# Patient Record
Sex: FEMALE | Race: WHITE | URBAN - METROPOLITAN AREA
[De-identification: names, ages, dates, MRNs, and addresses within clinical notes are randomized per-mention and may not be internally consistent; named-entity substitution may affect disease eponyms.]

---

## 2017-12-13 ENCOUNTER — TELEPHONE (OUTPATIENT)
Dept: NEUROLOGY | Facility: CLINIC | Age: 42
End: 2017-12-13

## 2017-12-13 RX ORDER — CLONAZEPAM 1 MG/1
1.5 TABLET ORAL AT BEDTIME
COMMUNITY
Start: 2017-11-08

## 2017-12-13 RX ORDER — HYDROXYZINE PAMOATE 25 MG/1
25-50 CAPSULE ORAL EVERY 6 HOURS PRN
COMMUNITY
Start: 2017-10-31

## 2017-12-13 RX ORDER — ATENOLOL 25 MG/1
25 TABLET ORAL DAILY
COMMUNITY
Start: 2017-10-31 | End: 2018-05-02

## 2017-12-13 RX ORDER — EPINEPHRINE 0.3 MG/.3ML
0.3 INJECTION SUBCUTANEOUS PRN
COMMUNITY
Start: 2017-10-31

## 2017-12-13 RX ORDER — ALPRAZOLAM 0.5 MG
0.5 TABLET ORAL 2 TIMES DAILY PRN
COMMUNITY
Start: 2017-10-12

## 2017-12-13 RX ORDER — DIPHENHYDRAMINE HCL 25 MG
25 CAPSULE ORAL EVERY 6 HOURS
COMMUNITY

## 2017-12-13 RX ORDER — MULTIVITAMIN WITH FOLIC ACID 400 MCG
1 TABLET ORAL DAILY
COMMUNITY
Start: 2017-12-08

## 2017-12-13 RX ORDER — OXYCODONE AND ACETAMINOPHEN 5; 325 MG/1; MG/1
1 TABLET ORAL EVERY 8 HOURS PRN
COMMUNITY
Start: 2018-01-05 | End: 2018-05-02
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
## 2017-12-14 ENCOUNTER — OFFICE VISIT (OUTPATIENT)
Dept: NEUROLOGY | Facility: CLINIC | Age: 42
End: 2017-12-14
Payer: COMMERCIAL

## 2017-12-14 VITALS
BODY MASS INDEX: 29.8 KG/M2 | DIASTOLIC BLOOD PRESSURE: 64 MMHG | HEART RATE: 73 BPM | TEMPERATURE: 98.4 F | SYSTOLIC BLOOD PRESSURE: 107 MMHG | HEIGHT: 66 IN | WEIGHT: 185.4 LBS

## 2017-12-14 DIAGNOSIS — G44.40 MEDICATION OVERUSE HEADACHE: ICD-10-CM

## 2017-12-14 DIAGNOSIS — Z87.828 HISTORY OF HEAD INJURY: ICD-10-CM

## 2017-12-14 DIAGNOSIS — G43.109 MIGRAINE WITH AURA AND WITHOUT STATUS MIGRAINOSUS, NOT INTRACTABLE: ICD-10-CM

## 2017-12-14 DIAGNOSIS — R56.9 CONVULSIONS, UNSPECIFIED CONVULSION TYPE (H): Primary | ICD-10-CM

## 2017-12-14 PROCEDURE — 99204 OFFICE O/P NEW MOD 45 MIN: CPT | Performed by: PSYCHIATRY & NEUROLOGY

## 2017-12-14 RX ORDER — GABAPENTIN 300 MG/1
CAPSULE ORAL
Qty: 90 CAPSULE | Refills: 3 | Status: SHIPPED | OUTPATIENT
Start: 2017-12-14 | End: 2018-05-02

## 2017-12-14 ASSESSMENT — PAIN SCALES - GENERAL: PAINLEVEL: EXTREME PAIN (9)

## 2017-12-14 NOTE — NURSING NOTE
MIRTA faxed to [facility] [phone].  Transmission confirmed via Right Fax.  MIRTA faxed to [facility].  [phone].  Transmission confirmed via Right Fax./ 01/04/18  Received records from [facility].  03/29/18  MIRTA re-faxed to [facility].  Janette Christianson NR-CMA/ 04/05/18 Records received from [facility].
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
## 2017-12-14 NOTE — PATIENT INSTRUCTIONS
"Plan:    MRI Brain.  Sleep-deprived electroencephalogram.   Talk to your primary care provider about tapering off of the Percocet. Goal is <2 times per week of any \"as needed\" headache medication.   Continue the atenolol.  Add gabapentin 300mg at bedtime to start --> Increase as instructed. *Information provided.   Return to clinic in 6-8 weeks.   "

## 2017-12-14 NOTE — NURSING NOTE
"Chief Complaint   Patient presents with     New Patient     Seizures, TBI, migraines.       Initial /64 (BP Location: Right arm, Patient Position: Chair, Cuff Size: Adult Regular)  Pulse 73  Temp 98.4  F (36.9  C) (Oral)  Ht 1.676 m (5' 6\")  Wt 84.1 kg (185 lb 6.4 oz)  Breastfeeding? No  BMI 29.92 kg/m2 Estimated body mass index is 29.92 kg/(m^2) as calculated from the following:    Height as of this encounter: 1.676 m (5' 6\").    Weight as of this encounter: 84.1 kg (185 lb 6.4 oz).  Medication Reconciliation: complete    Patient prefers to be contacted: letter, [phone] (mike) [address] [place]  [address]  Okay to leave detailed message on voicemail: n/a    Janette Christianson NR-CMA    "
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
## 2017-12-14 NOTE — MR AVS SNAPSHOT
"              After Visit Summary   12/14/2017    Selena Parson    MRN: [id number]           Patient Information     Date Of Birth          1975        Visit Information        Provider Department      12/14/2017 9:30 AM Danay Daniel MD [facility]        Today's Diagnoses     Convulsions, unspecified convulsion type (H)    -  1    History of head injury        Migraine with aura and without status migrainosus, not intractable        Medication overuse headache          Care Instructions    Plan:    MRI Brain.  Sleep-deprived electroencephalogram.   Talk to your primary care provider about tapering off of the Percocet. Goal is <2 times per week of any \"as needed\" headache medication.   Continue the atenolol.  Add gabapentin 300mg at bedtime to start --> Increase as instructed. *Information provided.   Return to clinic in 6-8 weeks.           Follow-ups after your visit        Follow-up notes from your care team     Return in about 6 weeks (around 1/25/2018).      Future tests that were ordered for you today     Open Future Orders        Priority Expected Expires Ordered    EEG sleep deprived Routine  4/14/2018 12/14/2017    MR Brain w/o & w Contrast Routine  12/14/2018 12/14/2017            Who to contact     If you have questions or need follow up information about today's clinic visit or your schedule please contact [facility] directly at [phone].  Normal or non-critical lab and imaging results will be communicated to you by MyChart, letter or phone within 4 business days after the clinic has received the results. If you do not hear from us within 7 days, please contact the clinic through MyChart or phone. If you have a critical or abnormal lab result, we will notify you by phone as soon as possible.  Submit refill requests through [employer] or call your pharmacy and they will forward the refill request to us. Please allow 3 business days for your refill to be completed. " "         Additional Information About Your Visit        MyChart Information     [employer] lets you send messages to your doctor, view your test results, renew your prescriptions, schedule appointments and more. To sign up, go to [email or web address] . Click on \"Log in\" on the left side of the screen, which will take you to the Welcome page. Then click on \"Sign up Now\" on the right side of the page.     You will be asked to enter the access code listed below, as well as some personal information. Please follow the directions to create your username and password.     Your access code is: [email or web address]  Expires: 3/14/2018 10:42 AM     Your access code will  in 90 days. If you need help or a new code, please call your [place] clinic or [phone].        Care EveryWhere ID     This is your Care EveryWhere ID. This could be used by other organizations to access your [facility] medical records  [id number]        Your Vitals Were     Pulse Temperature Height Breastfeeding? BMI (Body Mass Index)       73 98.4  F (36.9  C) (Oral) 1.676 m (5' 6\") No 29.92 kg/m2        Blood Pressure from Last 3 Encounters:   17 107/64    Weight from Last 3 Encounters:   17 84.1 kg (185 lb 6.4 oz)                 Today's Medication Changes          These changes are accurate as of: 17 10:42 AM.  If you have any questions, ask your nurse or doctor.               Start taking these medicines.        Dose/Directions    gabapentin 300 MG capsule   Commonly known as:  NEURONTIN   Used for:  Medication overuse headache, Convulsions, unspecified convulsion type (H), Migraine with aura and without status migrainosus, not intractable   Started by:  Danay Daniel MD        Take 1 tablet (300 mg) every night for 1-3 days, then 1 tablet twice daily for 1-3 days, then 1 tablet three times daily   Quantity:  90 capsule   Refills:  3            Where to get your medicines      These medications were sent to [id number] - " [place], [place] - [address]  [address] [address]     Phone:  [phone]     gabapentin 300 MG capsule                Primary Care Provider Office Phone # Fax #    Kathryn Moore [phone]       [facility] [address] [place] [address]        Equal Access to Services     [facility]: Hadii aad ku hadasho Soomaali, waaxda luqadaha, qaybta kaalmada adeegyada, waxay idiin hayaan adeeg kharash la'aan . So [facility] [phone].    ATENCIÓN: Si habla español, tiene a bai disposición servicios gratuitos de asistencia lingüística. Kaylenenedelia al [phone].    We comply with applicable federal civil rights laws and [place] laws. We do not discriminate on the basis of race, color, national origin, age, disability, sex, sexual orientation, or gender identity.            Thank you!     Thank you for choosing [facility]  for your care. Our goal is always to provide you with excellent care. Hearing back from our patients is one way we can continue to improve our services. Please take a few minutes to complete the written survey that you may receive in the mail after your visit with us. Thank you!             Your Updated Medication List - Protect others around you: Learn how to safely use, store and throw away your medicines at www.disposemymeds.org.          This list is accurate as of: 12/14/17 10:42 AM.  Always use your most recent med list.                   Brand Name Dispense Instructions for use Diagnosis    ALPRAZolam 0.5 MG tablet    XANAX     Take 0.5 mg by mouth 2 times daily as needed For panic        atenolol 25 MG tablet    TENORMIN     Take 25 mg by mouth daily For migraine prevention        clonazePAM 1 MG tablet    klonoPIN     Take 1.5 mg by mouth At Bedtime For sleep        diphenhydrAMINE 25 MG capsule    BENADRYL     Take 25 mg by mouth every 6 hours        EPINEPHrine 0.3 MG/0.3ML injection 2-pack    EPIPEN/ADRENACLICK/or ANY BX  GENERIC EQUIV     Inject 0.3 mg into the muscle as needed for anaphylaxis        gabapentin 300 MG capsule    NEURONTIN    90 capsule    Take 1 tablet (300 mg) every night for 1-3 days, then 1 tablet twice daily for 1-3 days, then 1 tablet three times daily    Medication overuse headache, Convulsions, unspecified convulsion type (H), Migraine with aura and without status migrainosus, not intractable       hydrOXYzine 25 MG capsule    VISTARIL     Take 25-50 mg by mouth every 6 hours as needed for nausea        oxyCODONE-acetaminophen 5-325 MG per tablet   Start taking on:  1/5/2018    PERCOCET     Take 1 tablet by mouth every 8 hours as needed for migraine        TAB-A-DOM Tabs      Take 1 tablet by mouth daily        tiZANidine 4 MG tablet    ZANAFLEX     Take 4 mg by mouth every 8 hours as needed Muscle spasms

## 2017-12-15 ENCOUNTER — TELEPHONE (OUTPATIENT)
Dept: NEUROLOGY | Facility: CLINIC | Age: 42
End: 2017-12-15

## 2017-12-15 NOTE — TELEPHONE ENCOUNTER
I spoke to Selena today. She saw Dr Daniel for the first time regarding head trauma/ headaches. She was given Gabapentin 300 mg to take at bedtime. She said that when she took the pill last night she began to experience upset stomach, blacking out, dizziness, light headedness. She stated multiple times that she has history of eating disorder. I asked if she ate and drank fluids normally yesterday and today. She said that she did. I asked if she has blacked out before and she said that she had. I told her that it is not unusual to have stomach aches and even dizziness to begin with, but this generally goes away. Blacking out would not be expected. I asked if she thought she had a seizure but she did not seem to think so, nor could she say how long she was out. I suggested that she not drive and to discontinue the medication until we hear from Dr Daniel. She said that she does not drive anyway. I told her , although not expected if here symptoms became worse including continued blacking out or any other life threatening symptom such as difficulty breathing or swallowing then she should call 911. I told her that Dr Daniel is not in clinic today that I would route her a message. She will hold the medication until we hear from Dr Daniel. Dr Daniel , how do you advise ?Karoline YODER RN

## 2017-12-15 NOTE — TELEPHONE ENCOUNTER
Reason for Call:  Other prescription    Detailed comments: pt calling statin she was seen yesterday. Took one gabapentin last night, experiencing upset stomach,  Blacking out, dizziness, light headedness. Had a eating disorder in the past and still has breakthroughs. Not sure if these are side effects from the gabapentin.     Phone Number Patient can be reached at: Home number on file [phone] (home)    Best Time: any     Can we leave a detailed message on this number? YES    Call taken on 12/15/2017 at 2:23 PM by Ayleen Ko

## 2017-12-18 NOTE — TELEPHONE ENCOUNTER
Spoke with pt and advised of note below. Discussed that if she stopped the medication and she felt better then she would know it was the medication making her dizzy. Pt stated she would decide if she wanted to stop the medication.   Advised pt that if she stopped the medication and continued to have symptoms to call and if she was feeling better that she should keep her scheduled appt for follow up. Pt agreed with plan.   Jigna HOBSON RN BSN PHN  Specialty Clinics

## 2017-12-18 NOTE — TELEPHONE ENCOUNTER
No, the blacking out would not be typical for the gabapentin and there is likely a different cause. If she continues to have problems with dizziness and upset stomach, she should stop the medication.     CY

## 2018-01-09 ENCOUNTER — TELEPHONE (OUTPATIENT)
Dept: NEUROLOGY | Facility: CLINIC | Age: 43
End: 2018-01-09

## 2018-01-09 DIAGNOSIS — F40.240 CLAUSTROPHOBIA: Primary | ICD-10-CM

## 2018-01-09 RX ORDER — LORAZEPAM 1 MG/1
TABLET ORAL
Qty: 2 TABLET | Refills: 0 | Status: SHIPPED | OUTPATIENT
Start: 2018-01-09 | End: 2018-05-02

## 2018-01-09 NOTE — TELEPHONE ENCOUNTER
Reason for Call:  Other call back    Detailed comments: EEG & MRI were recommended.  Pt went to schedule and they asked her if she is claustrophobic - which she is.  She was told to contact MD for medication to help with this.  Pharmacy: [facility]    Phone Number Patient can be reached at: Home number on file [phone] (home)    Best Time: any    Can we leave a detailed message on this number? No doesn't have it but you could try her also at [phone]    Call taken on 1/9/2018 at 12:51 PM by Edie Rachel

## 2018-01-09 NOTE — TELEPHONE ENCOUNTER
I checked with Selena--  They've scheduled the EEG at 8am that day, and the MRI at noon, because of the sedation issue.      Prescription cued up.

## 2018-01-09 NOTE — TELEPHONE ENCOUNTER
Pt requesting medication for claustrophobia for EEG and MRI.  See note below.  Jigna HOBSON RN BSN PHN  Specialty Clinics

## 2018-01-18 ENCOUNTER — HOSPITAL ENCOUNTER (OUTPATIENT)
Dept: MRI IMAGING | Facility: CLINIC | Age: 43
End: 2018-01-18
Attending: PSYCHIATRY & NEUROLOGY
Payer: COMMERCIAL

## 2018-01-18 ENCOUNTER — HOSPITAL ENCOUNTER (OUTPATIENT)
Dept: NEUROLOGY | Facility: CLINIC | Age: 43
Discharge: HOME | End: 2018-01-18
Attending: PSYCHIATRY & NEUROLOGY | Admitting: PSYCHIATRY & NEUROLOGY
Payer: COMMERCIAL

## 2018-01-18 DIAGNOSIS — R56.9 CONVULSIONS, UNSPECIFIED CONVULSION TYPE (H): ICD-10-CM

## 2018-01-18 DIAGNOSIS — Z87.828 HISTORY OF HEAD INJURY: ICD-10-CM

## 2018-01-18 DIAGNOSIS — G43.109 MIGRAINE WITH AURA AND WITHOUT STATUS MIGRAINOSUS, NOT INTRACTABLE: ICD-10-CM

## 2018-01-18 PROCEDURE — 25000128 H RX IP 250 OP 636: Performed by: PSYCHIATRY & NEUROLOGY

## 2018-01-18 PROCEDURE — 95819 EEG AWAKE AND ASLEEP: CPT

## 2018-01-18 PROCEDURE — 70553 MRI BRAIN STEM W/O & W/DYE: CPT

## 2018-01-18 PROCEDURE — A9585 GADOBUTROL INJECTION: HCPCS | Performed by: PSYCHIATRY & NEUROLOGY

## 2018-01-18 RX ORDER — GADOBUTROL 604.72 MG/ML
8 INJECTION INTRAVENOUS ONCE
Status: COMPLETED | OUTPATIENT
Start: 2018-01-18 | End: 2018-01-18

## 2018-01-18 RX ADMIN — GADOBUTROL 8 ML: 604.72 INJECTION INTRAVENOUS at 13:13

## 2018-01-20 NOTE — PROCEDURES
OUTPATIENT SLEEP-DEPRIVED EEG REPORT.    Patient Name:  Selena Parson  MRN:     [id number]  : `    1975    EEG#: .        DATE OF RECORDIN2018.       DURATION OF RECORDIN minutes and 54 seconds.        CLINICAL SUMMARY: Selena Parson is 42 year old female patient with past medical history of insomnia, anxiety, head trauma, and migraine, who was recently seen in neurology clinic for spells of altered consciousness. This study was ordered to evaluate for seizures and epileptiform abnormalities. On the day of the study, the patient was reported to take lorazepam, gabapentin, Xanax, Klonopin, Benadryl, Percocet, and Zanaflex amongst other listed in medical record medications.      TECHNICAL SUMMARY:  This outpatient sleep-deprived EEG monitoring procedure was performed with 19 scalp electrodes in 10-20 system placements, and additional scalp, precordial, and other surface electrodes used for electrical referencing and artifact detection. Video monitoring was not utilized.          INTERICTAL EEG ACTIVITIES: During maximal wakefulness, background consists of symmetric low voltage up to 10 Hz posterior dominant rhythm, that is poorly organized and attenuates with eye opening.  It is intermixed with diffuse beta activity.  There is no diffuse or focal slowing during waking seen. Drowsiness is manifested by predominance of centrally maximum semirhythmic theta slowing and dropout of posterior dominant rhythm. Brief occurrence of stage II sleep was characterized by symmetric sleep spindles and vertex waves. Intermittent photic stimulation and hyperventilation were not performed.        INTERICTAL EPILEPTIFORM DISCHARGES: None.        ICTAL RECORDINGS: No electrographic or clinical seizures and no paroxysmal behavioral events were recorded during this study.        IMPRESSION: This outpatient sleep-deprived EEG study is abnormal during wakefulness and sleep due to excessive diffuse beta  activity. This could be seen with increased anxiety, use of alcohol, barbiturates, benzodiazepines, and tricyclic antidepressants. In this particular case, use of multiple benzodiazepines is a most likely explanation.  No interictal epileptiform discharges, no electrographic or clinical seizures, and no paroxysmal behavioral events.  Clinical correlation is recommended.  Hayes Hauser MD.

## 2018-01-20 NOTE — PROGRESS NOTES
Please advise Selena Parson,  1975, that her electroencephalogram was unremarkable (no seizure activity). The MRI shows normal brain structure. She does have some sinus polyps. If she wants to have these investigated further, I am happy to refer her to ENT. Otherwise, she can follow-up with me at the end of the month as scheduled.  [phone] (home)     Thank you,  Danay Daniel

## 2018-01-22 DIAGNOSIS — J33.8 MAXILLARY SINUS POLYP: Primary | ICD-10-CM

## 2018-01-22 NOTE — ADDENDUM NOTE
Encounter addended by: Edilia Christianson CMA on: 1/22/2018  9:28 AM<BR>     Actions taken: Results reviewed in IB

## 2018-02-22 ENCOUNTER — TELEPHONE (OUTPATIENT)
Dept: NEUROLOGY | Facility: CLINIC | Age: 43
End: 2018-02-22

## 2018-02-22 NOTE — TELEPHONE ENCOUNTER
Reason for Call:  Other appointment    Detailed comments: pt calling stating she had to r/s her last appt due to transportation issues. She is now scheduled into may. She would like to get seen sooner then this for her headaches. She had to stop taking the gabapentin and would like to get on something different ASAP if she has to wait to be seen.     Phone Number Patient can be reached at: Other phone number:  [phone]*    Best Time: any     Can we leave a detailed message on this number? YES    Call taken on 2/22/2018 at 10:55 AM by Ayleen Ko

## 2018-02-22 NOTE — TELEPHONE ENCOUNTER
"Please see note below and advise.   Jigna HOBSON RN BSN PHN  Specialty Clinics      LOV 12//2017  Patient Instructions:  MRI Brain.  Sleep-deprived electroencephalogram.   Talk to your primary care provider about tapering off of the Percocet. Goal is <2 times per week of any \"as needed\" headache medication.   Continue the atenolol.  Add gabapentin 300mg at bedtime to start --> Increase as instructed. *Information provided.   Return to clinic in 6-8 weeks.      "

## 2018-02-22 NOTE — TELEPHONE ENCOUNTER
I called patient, but the phone was set up as a fax x 2.  I can get the patient in 3/22/18 at 1:30pm, if that will work for her.  We can also put her on the wait list to be sooner if we have a cancellation.  If she feels she needs something before she can get in, we should route a note to Dr. Daniel to see if she's got some thoughts on alternate treatment.

## 2018-02-26 NOTE — TELEPHONE ENCOUNTER
Tried to call pt on both #, no answer disconnected or fax machine notice    Ayleen Ko  Specialty CSS

## 2018-02-26 NOTE — TELEPHONE ENCOUNTER
Will route to CSS pool to have them continue to try to reach patient.  We also have a 2:15 appt this afternoon, as well.

## 2018-03-14 NOTE — TELEPHONE ENCOUNTER
Tried calling pt both #s no answer and or fax machine notice.   Closing encounter     Ayleen Ko  Specialty CSS

## 2018-05-02 ENCOUNTER — OFFICE VISIT (OUTPATIENT)
Dept: NEUROLOGY | Facility: CLINIC | Age: 43
End: 2018-05-02
Payer: COMMERCIAL

## 2018-05-02 VITALS
HEART RATE: 93 BPM | DIASTOLIC BLOOD PRESSURE: 70 MMHG | TEMPERATURE: 98.1 F | SYSTOLIC BLOOD PRESSURE: 108 MMHG | RESPIRATION RATE: 12 BRPM

## 2018-05-02 DIAGNOSIS — G43.009 MIGRAINE WITHOUT AURA AND WITHOUT STATUS MIGRAINOSUS, NOT INTRACTABLE: Primary | ICD-10-CM

## 2018-05-02 DIAGNOSIS — R55 SYNCOPE, UNSPECIFIED SYNCOPE TYPE: ICD-10-CM

## 2018-05-02 DIAGNOSIS — Z87.820 H/O TRAUMATIC BRAIN INJURY: ICD-10-CM

## 2018-05-02 PROCEDURE — 99215 OFFICE O/P EST HI 40 MIN: CPT | Performed by: PSYCHIATRY & NEUROLOGY

## 2018-05-02 RX ORDER — CYCLOBENZAPRINE HCL 5 MG
5 TABLET ORAL 3 TIMES DAILY PRN
Qty: 42 TABLET | Refills: 1 | Status: SHIPPED | OUTPATIENT
Start: 2018-05-02

## 2018-05-02 NOTE — PATIENT INSTRUCTIONS
Plan:    Flexeril as needed for headaches. Stop the tizanidine. Long-term, the plan is to get you into Pain Clinic. Referral made.   I have also referred you to our cardiology clinic, per your request and your primary care provider's recommendation.   Return to neurology after cardiac work-up (2-3 months). We will decide at that point if additional seizure work-up is warranted.

## 2018-05-02 NOTE — MR AVS SNAPSHOT
After Visit Summary   5/2/2018    Selena Parson    MRN: [id number]           Patient Information     Date Of Birth          1975        Visit Information        Provider Department      5/2/2018 10:15 AM Danay Daniel MD [facility]        Today's Diagnoses     Migraine without aura and without status migrainosus, not intractable    -  1    H/O traumatic brain injury        Syncope, unspecified syncope type          Care Instructions    Plan:    Flexeril as needed for headaches. Stop the tizanidine. Long-term, the plan is to get you into Pain Clinic. Referral made.   I have also referred you to our cardiology clinic, per your request and your primary care provider's recommendation.   Return to neurology after cardiac work-up (2-3 months). We will decide at that point if additional seizure work-up is warranted.                 Follow-ups after your visit        Additional Services     CARDIOLOGY EVAL ADULT REFERRAL       Preferred location:  [facility] [phone]   [email or web address]    Please be aware that coverage of these services is subject to the terms and limitations of your health insurance plan.  Call member services at your health plan with any benefit or coverage questions.      Please bring the following to your appointment:  Any x-rays, CTs or MRIs which have been performed. Contact the facility where they were done to arrange for  prior to your scheduled appointment.    List of current medications  This referral request   Any documents/labs given to you for this referral            PAIN MANAGEMENT REFERRAL       Your provider has referred you to: [facility] [place] -    Reason for Referral: Evaluation for comprehensive services- patients will be evaluated if appropriate for comprehensive service including medication changes, procedures, pain psychology, and pain  physical therapy.  While involved with comprehensive services, pain providers will work with referring provider/PCP to stabilize appropriate medication management, with long-term plan of transition of prescribing back to referring provider/PCP upon completion of comprehensive services.      Please complete the following questions:    Do you have any specific questions for the pain specialist? Yes: Non-pharmacologic headache management. Patient has aversions/allergies to many medications previously-tried.     Are there any red flags that may impact the assessment or management of the patient? None      What is your diagnosis for the patient's pain? Migraine, neck pain, history of TBI      For any questions, contact the [facility] at [phone].     **ANY DIAGNOSTIC TESTS THAT ARE NOT IN EPIC SHOULD BE SENT TO THE PAIN CENTER**    REGARDING OPIOID MEDICATIONS:  The discussion of opioids management, appropriateness of therapy, and dosing will be discussed in patients being seen for evaluation.  The pain management clinics are not long-term prescribing clinics, with transition of prescribing of medications ultimately going back to the referring provider/PCP.  If prescribing is taken over at the pain clinic, it is in actively involved patients whom are appropriate for opioids, urine drug screening is completed, and long-term prescribing plan has been determined.  Therefore, we will not be automatically taking over prescribing at the patient's first visit.  Is this agreeable to you? agrees.     Please be aware that coverage of these services is subject to the terms and limitations of your health insurance plan.  Call member services at your health plan with any benefit or coverage questions.      Please bring the following with you to your appointment:    (1) Any X-Rays, CTs or MRIs which have been performed.  Contact the facility where they were done to arrange for  prior to your scheduled  "appointment.    (2) List of current medications   (3) This referral request   (4) Any documents/labs given to you for this referral                  Who to contact     If you have questions or need follow up information about today's clinic visit or your schedule please contact [facility] directly at [phone].  Normal or non-critical lab and imaging results will be communicated to you by MyChart, letter or phone within 4 business days after the clinic has received the results. If you do not hear from us within 7 days, please contact the clinic through [employer] or phone. If you have a critical or abnormal lab result, we will notify you by phone as soon as possible.  Submit refill requests through [employer] or call your pharmacy and they will forward the refill request to us. Please allow 3 business days for your refill to be completed.          Additional Information About Your Visit        MyChart Information     [employer] lets you send messages to your doctor, view your test results, renew your prescriptions, schedule appointments and more. To sign up, go to [email or web address] . Click on \"Log in\" on the left side of the screen, which will take you to the Welcome page. Then click on \"Sign up Now\" on the right side of the page.     You will be asked to enter the access code listed below, as well as some personal information. Please follow the directions to create your username and password.     Your access code is: [email or web address]  Expires: 2018 11:28 AM     Your access code will  in 90 days. If you need help or a new code, please call your [facility] or [phone].        Care EveryWhere ID     This is your Care EveryWhere ID. This could be used by other organizations to access your [facility] medical records  [id number]        Your Vitals Were     Pulse Temperature Respirations             93 98.1  F (36.7  C) (Tympanic) 12          Blood Pressure from Last 3 Encounters:   18 " 108/70   12/14/17 107/64    Weight from Last 3 Encounters:   12/14/17 84.1 kg (185 lb 6.4 oz)              We Performed the Following     CARDIOLOGY EVAL ADULT REFERRAL     PAIN MANAGEMENT REFERRAL          Today's Medication Changes          These changes are accurate as of 5/2/18 11:28 AM.  If you have any questions, ask your nurse or doctor.               Start taking these medicines.        Dose/Directions    cyclobenzaprine 5 MG tablet   Commonly known as:  FLEXERIL   Used for:  Migraine without aura and without status migrainosus, not intractable, H/O traumatic brain injury   Started by:  Danay Daniel MD        Dose:  5 mg   Take 1 tablet (5 mg) by mouth 3 times daily as needed for muscle spasms   Quantity:  42 tablet   Refills:  1            Where to get your medicines      These medications were sent to [id number] - [place], [place] - [address]  [address] [facility] [address] [facility] [address]     Phone:  [phone]     cyclobenzaprine 5 MG tablet                Primary Care Provider Office Phone # Fax #    Kathryn Moore [phone]       [facility] [address] [address] [address]        Equal Access to Services     [facility] AH: Hadii aad ku hadasho Soomaali, waaxda luqadaha, qaybta kaalmada adeegyada, waxay rosendo haysherin clint dobbins . So [facility] [phone].    ATENCIÓN: Si habla español, tiene a bai disposición servicios gratuitos de asistencia lingüística. Llame al [phone].    We comply with applicable federal civil rights laws and [place] laws. We do not discriminate on the basis of race, color, national origin, age, disability, sex, sexual orientation, or gender identity.            Thank you!     Thank you for choosing [facility]  for your care. Our goal is always to provide you with excellent care. Hearing back from our patients is one way we can continue to improve our services. Please take a few minutes to complete the  written survey that you may receive in the mail after your visit with us. Thank you!             Your Updated Medication List - Protect others around you: Learn how to safely use, store and throw away your medicines at www.disposemymeds.org.          This list is accurate as of 5/2/18 11:28 AM.  Always use your most recent med list.                   Brand Name Dispense Instructions for use Diagnosis    ALPRAZolam 0.5 MG tablet    XANAX     Take 0.5 mg by mouth 2 times daily as needed For panic        clonazePAM 1 MG tablet    klonoPIN     Take 1.5 mg by mouth At Bedtime For sleep        cyclobenzaprine 5 MG tablet    FLEXERIL    42 tablet    Take 1 tablet (5 mg) by mouth 3 times daily as needed for muscle spasms    Migraine without aura and without status migrainosus, not intractable, H/O traumatic brain injury       diphenhydrAMINE 25 MG capsule    BENADRYL     Take 25 mg by mouth every 6 hours        EPINEPHrine 0.3 MG/0.3ML injection 2-pack    EPIPEN/ADRENACLICK/or ANY BX GENERIC EQUIV     Inject 0.3 mg into the muscle as needed for anaphylaxis        hydrOXYzine 25 MG capsule    VISTARIL     Take 25-50 mg by mouth every 6 hours as needed for nausea        TAB-A-DOM Tabs      Take 1 tablet by mouth daily        tiZANidine 4 MG tablet    ZANAFLEX     Take 4 mg by mouth every 8 hours as needed Muscle spasms

## 2018-05-02 NOTE — NURSING NOTE
"Chief Complaint   Patient presents with     RECHECK     Seizures, migraines, blackouts       Initial /70 (BP Location: Left arm, Patient Position: Sitting, Cuff Size: Adult Regular)  Pulse 93  Temp 98.1  F (36.7  C) (Tympanic)  Resp 12 Estimated body mass index is 29.92 kg/(m^2) as calculated from the following:    Height as of 12/14/17: 1.676 m (5' 6\").    Weight as of 12/14/17: 84.1 kg (185 lb 6.4 oz).  Medication Reconciliation: complete    Patient prefers to be contacted: letter  Okay to leave detailed message on voicemail: n/a      Janette SUE-CMA    "
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
